# Patient Record
Sex: FEMALE | URBAN - METROPOLITAN AREA
[De-identification: names, ages, dates, MRNs, and addresses within clinical notes are randomized per-mention and may not be internally consistent; named-entity substitution may affect disease eponyms.]

---

## 2024-05-03 ENCOUNTER — TELEPHONE (OUTPATIENT)
Dept: NEUROLOGY | Facility: CLINIC | Age: 34
End: 2024-05-03
Payer: COMMERCIAL

## 2024-05-03 NOTE — TELEPHONE ENCOUNTER
After speaking with Dr. Saldana, I called the pt and informed her that he does not perform biopsies. I forwarded her message to the [facility] neurosurgery and neurology teams to see if someone could assist in getting her scheduled with the correct provider. I canceled the pt's np appt with Dr. Saldana. Pt verbalized understanding.

## 2024-05-03 NOTE — TELEPHONE ENCOUNTER
Spoke with pt about appt. She stated that her neurologist in [place] Referred her here for a muscle biopsy, so she made an appt with Dr. Saldana. Per dr. Saldana, he does not do muscle biopsies. I told the pt I would find out the best course of action and let her know. Pt verbalized understanding.

## 2024-05-06 ENCOUNTER — TELEPHONE (OUTPATIENT)
Dept: NEUROSURGERY | Facility: CLINIC | Age: 34
End: 2024-05-06
Payer: COMMERCIAL

## 2024-05-06 NOTE — TELEPHONE ENCOUNTER
----- Message from Mariposa Banuelos MA sent at 5/3/2024  3:49 PM CDT -----  Regarding: Muscle Biopsy  Contact: ted  Good afternoon,     This pt is being referred from her neurologist in [place]. They are wanting her to have a muscle biopsy done, but Dr. Saldana doesn't perform biopsies. Could someone please direct this pt to the correct dept and help get her scheduled correctly? Thanks so much! Have a great weekend!    TARIQ Monge  ----- Message -----  From: [place]  Sent: 5/3/2024   3:30 PM CDT  To: [facility] Staff    The pt is needing a call back in regards to her NP appt. Please give a call back at [phone]

## 2024-05-30 ENCOUNTER — TELEPHONE (OUTPATIENT)
Dept: NEUROSURGERY | Facility: CLINIC | Age: 34
End: 2024-05-30
Payer: COMMERCIAL

## 2024-05-30 NOTE — TELEPHONE ENCOUNTER
Spoke w/ pt. Pt stated she did not realize she had appt scheduled next week. Advised her that appt is to discuss a muscle biopsy. Advised pt to call neurologist in [place] and have them fax referral and progress notes. Pt v/u and thanks.